# Patient Record
Sex: MALE | Race: WHITE | NOT HISPANIC OR LATINO | Employment: FULL TIME | URBAN - METROPOLITAN AREA
[De-identification: names, ages, dates, MRNs, and addresses within clinical notes are randomized per-mention and may not be internally consistent; named-entity substitution may affect disease eponyms.]

---

## 2017-04-03 ENCOUNTER — HOSPITAL ENCOUNTER (EMERGENCY)
Facility: HOSPITAL | Age: 37
Discharge: HOME | End: 2017-04-04
Attending: EMERGENCY MEDICINE | Admitting: EMERGENCY MEDICINE

## 2017-04-03 DIAGNOSIS — T15.91XA FOREIGN BODY OF RIGHT EYE, INITIAL ENCOUNTER: Primary | ICD-10-CM

## 2017-04-03 PROCEDURE — 99283 EMERGENCY DEPT VISIT LOW MDM: CPT

## 2017-04-04 VITALS
OXYGEN SATURATION: 97 % | WEIGHT: 178 LBS | HEART RATE: 79 BPM | SYSTOLIC BLOOD PRESSURE: 142 MMHG | HEIGHT: 73 IN | BODY MASS INDEX: 23.59 KG/M2 | RESPIRATION RATE: 18 BRPM | TEMPERATURE: 98.3 F | DIASTOLIC BLOOD PRESSURE: 92 MMHG

## 2017-04-04 PROCEDURE — 99282 EMERGENCY DEPT VISIT SF MDM: CPT | Performed by: EMERGENCY MEDICINE

## 2017-04-04 RX ORDER — PURIFIED WATER 986 MG/ML
SOLUTION OPHTHALMIC ONCE AS NEEDED
Status: COMPLETED | OUTPATIENT
Start: 2017-04-04 | End: 2017-04-04

## 2017-04-04 RX ORDER — PURIFIED WATER 986 MG/ML
SOLUTION OPHTHALMIC
Status: COMPLETED
Start: 2017-04-04 | End: 2017-04-04

## 2017-04-04 RX ORDER — BUPRENORPHINE HYDROCHLORIDE AND NALOXONE HYDROCHLORIDE DIHYDRATE 8; 2 MG/1; MG/1
1 TABLET SUBLINGUAL EVERY MORNING
COMMUNITY

## 2017-04-04 RX ORDER — TRAMADOL HYDROCHLORIDE 50 MG/1
50 TABLET ORAL EVERY 6 HOURS PRN
Qty: 20 TABLET | Refills: 0 | Status: SHIPPED | OUTPATIENT
Start: 2017-04-04 | End: 2019-07-30

## 2017-04-04 RX ORDER — PROPARACAINE HYDROCHLORIDE 5 MG/ML
2 SOLUTION/ DROPS OPHTHALMIC ONCE
Status: COMPLETED | OUTPATIENT
Start: 2017-04-04 | End: 2017-04-04

## 2017-04-04 RX ORDER — CIPROFLOXACIN HYDROCHLORIDE 3.5 MG/ML
1 SOLUTION/ DROPS TOPICAL
Qty: 10 ML | Refills: 0 | Status: SHIPPED | OUTPATIENT
Start: 2017-04-04 | End: 2019-07-30

## 2017-04-04 RX ADMIN — PURIFIED WATER: 986 SOLUTION OPHTHALMIC at 00:27

## 2017-04-04 RX ADMIN — FLUORESCEIN SODIUM 1 STRIP: 1 STRIP OPHTHALMIC at 00:30

## 2017-04-04 RX ADMIN — PROPARACAINE HYDROCHLORIDE 2 DROP: 5 SOLUTION/ DROPS OPHTHALMIC at 00:29

## 2017-04-04 NOTE — ED PROVIDER NOTES
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  There is some clear drainage coming from the eye.    Narrative: This 36-year-old white male felt like he got something in his eye while driving and around dark tonight.  Complains of some pain in the right upper and right lower lid.  He has had some clear drainage from the eye.  His vision is unchanged.    PCP:  No Known Provider      Review of Systems   Eyes: Positive for pain, discharge (clear) and redness (mild). Negative for photophobia and visual disturbance.        Medication List      ASK your doctor about these medications          buprenorphine-naloxone 8-2 MG per SL tablet   Commonly known as:  SUBOXONE           Past Medical History:   Diagnosis Date   • Narcotic addiction        No Known Allergies    Past Surgical History:   Procedure Laterality Date   • TONSILLECTOMY         History reviewed. No pertinent family history.    Social History     Social History   • Marital status: Single     Spouse name: N/A   • Number of children: N/A   • Years of education: N/A     Social History Main Topics   • Smoking status: Current Every Day Smoker     Packs/day: 1.00     Types: Cigarettes   • Smokeless tobacco: None   • Alcohol use 0.6 oz/week     1 Cans of beer per week   • Drug use: No   • Sexual activity: Defer     Other Topics Concern   • None     Social History Narrative   • None           Objective   Physical Exam   Constitutional: He appears well-developed and well-nourished. No distress.   ED Triage Vitals:  Temp: 98.3 °F (36.8 °C) (04/04/17 0000)  Heart Rate: 79 (04/04/17 0000)  Resp: 18 (04/04/17 0000)  BP: 142/92  (04/04/17 0000)  SpO2: 97 % (04/04/17 0000)  Temp src: Oral (04/04/17 0000)  Heart Rate Source: Monitor (04/04/17 0000)  Patient Position: Lying (04/04/17 0000)  BP Location: Right arm (04/04/17 0000)  FiO2 (%): n/a    The patient's vitals were reviewed by me.  Unless otherwise noted they are within normal limits.     Eyes: EOM are normal. Pupils are equal, round, and reactive to light. Right eye exhibits discharge (clear). Right eye exhibits no chemosis, no exudate and no hordeolum. Foreign body present in the right eye. Right conjunctiva is injected. Right conjunctiva has no hemorrhage. No scleral icterus.   Fundoscopic exam:       The right eye shows no AV nicking, no exudate, no hemorrhage and no papilledema.   Proparacaine DROPS WERE APPLIED TO THE RIGHT EYE.    The visual acuity was 20/40 in the right eye and 20/40 in the left eye    The fluoroscein stain was negative.    The right eye was irrigated.    The surface the lids were swept with a moistened Q-tip and a form bodies removed from the right upper eyelid.   Nursing note and vitals reviewed.      Procedures         ED Course  ED Course      1:06 AM, 04/04/17:  The patient's diagnosis of a foreign body in the right eye was discussed with him.  The patient will be given a prescription for Ciloxan drops and pain medication.  Patient should follow-up with Dr. Panchal in the office tomorrow.  He has been instructed return to emergency department has increasing pain, change in vision, worsen anyway at all.  All of the patient's questions were answered, he will be discharged in good condition.            MDM  No orders to display     Labs Reviewed - No data to display  No results found.    Final diagnoses:   None         ED Medications:  Medications   fluorescein ophthalmic strip 1 strip (not administered)   proparacaine (ALCAINE) 0.5 % ophthalmic solution 2 drop (not administered)   balanced salts (EYE STREAM) ophthalmic solution  - ADS Override Pull (not  administered)       New Medications:     Medication List      ASK your doctor about these medications          buprenorphine-naloxone 8-2 MG per SL tablet   Commonly known as:  SUBOXONE           Stopped Medications:     Medication List      ASK your doctor about these medications          buprenorphine-naloxone 8-2 MG per SL tablet   Commonly known as:  SUBOXONE             Final diagnoses:   Foreign body of right eye, initial encounter            Yair Fuentes MD  04/04/17 0110

## 2017-04-04 NOTE — DISCHARGE INSTRUCTIONS
Follow up with Dr. Panchal in the morning.  Return if there is increasing pain, change in vision, worse in  anyway at all.

## 2019-04-09 ENCOUNTER — TRANSCRIBE ORDERS (OUTPATIENT)
Dept: ADMINISTRATIVE | Facility: HOSPITAL | Age: 39
End: 2019-04-09

## 2019-04-09 DIAGNOSIS — B18.2 CHRONIC HEPATITIS C WITHOUT HEPATIC COMA (HCC): Primary | ICD-10-CM

## 2020-06-09 ENCOUNTER — HOSPITAL ENCOUNTER (EMERGENCY)
Facility: HOSPITAL | Age: 40
Discharge: HOME | End: 2020-06-09
Attending: EMERGENCY MEDICINE | Admitting: EMERGENCY MEDICINE

## 2020-06-09 VITALS
SYSTOLIC BLOOD PRESSURE: 125 MMHG | HEIGHT: 74 IN | RESPIRATION RATE: 16 BRPM | OXYGEN SATURATION: 99 % | DIASTOLIC BLOOD PRESSURE: 88 MMHG | WEIGHT: 175 LBS | HEART RATE: 82 BPM | TEMPERATURE: 97.7 F | BODY MASS INDEX: 22.46 KG/M2

## 2020-06-09 DIAGNOSIS — S05.01XA ABRASION OF RIGHT CORNEA, INITIAL ENCOUNTER: Primary | ICD-10-CM

## 2020-06-09 PROCEDURE — 99284 EMERGENCY DEPT VISIT MOD MDM: CPT | Performed by: EMERGENCY MEDICINE

## 2020-06-09 PROCEDURE — 99283 EMERGENCY DEPT VISIT LOW MDM: CPT

## 2020-06-09 RX ORDER — CYCLOPENTOLATE HYDROCHLORIDE 10 MG/ML
2 SOLUTION/ DROPS OPHTHALMIC ONCE
Status: COMPLETED | OUTPATIENT
Start: 2020-06-09 | End: 2020-06-09

## 2020-06-09 RX ORDER — DICLOFENAC SODIUM 1 MG/ML
2 SOLUTION/ DROPS OPHTHALMIC ONCE
Status: COMPLETED | OUTPATIENT
Start: 2020-06-09 | End: 2020-06-09

## 2020-06-09 RX ORDER — DICLOFENAC SODIUM 1 MG/ML
1 SOLUTION/ DROPS OPHTHALMIC
Qty: 5 ML | Refills: 0 | Status: SHIPPED | OUTPATIENT
Start: 2020-06-09 | End: 2020-06-12

## 2020-06-09 RX ORDER — PROPARACAINE HYDROCHLORIDE 5 MG/ML
SOLUTION/ DROPS OPHTHALMIC
Status: COMPLETED
Start: 2020-06-09 | End: 2020-06-09

## 2020-06-09 RX ORDER — POLYMYXIN B SULFATE AND TRIMETHOPRIM 1; 10000 MG/ML; [USP'U]/ML
1 SOLUTION OPHTHALMIC EVERY 6 HOURS
Qty: 10 ML | Refills: 0 | Status: SHIPPED | OUTPATIENT
Start: 2020-06-09 | End: 2020-06-12

## 2020-06-09 RX ADMIN — PROPARACAINE HYDROCHLORIDE: 5 SOLUTION/ DROPS OPHTHALMIC at 02:16

## 2020-06-09 RX ADMIN — CYCLOPENTOLATE HYDROCHLORIDE 2 DROP: 10 SOLUTION/ DROPS OPHTHALMIC at 02:13

## 2020-06-09 RX ADMIN — DICLOFENAC SODIUM 2 DROP: 1 SOLUTION OPHTHALMIC at 02:13

## 2020-06-09 RX ADMIN — FLUORESCEIN SODIUM: 1 STRIP OPHTHALMIC at 02:16

## 2020-06-09 NOTE — ED PROVIDER NOTES
"Subjective     History provided by:  Patient    History of Present Illness    · Chief complaint: Eye pain    · Location: Right eye    · Quality/Severity: Right eye painful with pain of the right upper eyelid and \"a hair under my eye lid\".    · Timing/Onset: The patient has solved as could from a saw into his right eye at 7 PM yesterday.  He developed pain in the eye at 2 AM this morning.    · Modifying Factors: Light exacerbates the pain.  The patient states that he is put Q-tips in his eye and flushed his eye trying to get a hair that he saw under his upper lid out.    · Associated symptoms: Vision is slightly blurry in the right eye and photophobia.    · Narrative: The patient is a 39-year-old white male who was cutting wood with a saw without safety glasses when sawdust was kicked into his right eye at 7 PM last night.  He states he flushed it out in the felt fine until he was awoken at 2 AM by eye pain.  He states the light exacerbates the pain.  He states when he looked to his eyelid he thought he saw a hair.  He states he lifted the eyelid multiple times and tried using a Q-tip to remove the eyelid.  He also flushed his eye.    Review of Systems   Constitutional: Negative for activity change, appetite change, chills, diaphoresis, fatigue and fever.   HENT: Negative for congestion, dental problem, ear pain, hearing loss, mouth sores, postnasal drip, rhinorrhea, sinus pressure, sore throat and voice change.    Eyes: Positive for photophobia, pain, redness and visual disturbance. Negative for discharge.   Respiratory: Negative for cough, chest tightness, shortness of breath, wheezing and stridor.    Cardiovascular: Negative for chest pain, palpitations and leg swelling.   Gastrointestinal: Negative for abdominal pain, diarrhea, nausea and vomiting.   Genitourinary: Negative for difficulty urinating, dysuria, flank pain, frequency, hematuria and urgency.   Musculoskeletal: Negative for arthralgias, back pain, gait " "problem, joint swelling, myalgias, neck pain and neck stiffness.   Skin: Negative for color change and rash.   Neurological: Negative for dizziness, tremors, seizures, syncope, facial asymmetry, speech difficulty, weakness, light-headedness, numbness and headaches.   Hematological: Negative for adenopathy.   Psychiatric/Behavioral: Positive for sleep disturbance. Negative for confusion and decreased concentration. The patient is not nervous/anxious.      Past Medical History:   Diagnosis Date   • Narcotic addiction (CMS/HCC)      /88   Pulse 82   Temp 97.7 °F (36.5 °C) (Oral)   Resp 16   Ht 188 cm (74\")   Wt 79.4 kg (175 lb)   SpO2 99%   BMI 22.47 kg/m²     Past Medical History:   Diagnosis Date   • Narcotic addiction (CMS/HCC)        No Known Allergies    Past Surgical History:   Procedure Laterality Date   • TONSILLECTOMY         Family History   Problem Relation Age of Onset   • No Known Problems Mother        Social History     Socioeconomic History   • Marital status: Single     Spouse name: Not on file   • Number of children: Not on file   • Years of education: Not on file   • Highest education level: Not on file   Tobacco Use   • Smoking status: Current Every Day Smoker     Packs/day: 1.00     Types: Cigarettes   Substance and Sexual Activity   • Alcohol use: No     Alcohol/week: 1.0 standard drinks     Types: 1 Cans of beer per week     Frequency: Never   • Drug use: No   • Sexual activity: Defer           Objective   Physical Exam   Constitutional: He is oriented to person, place, and time. He appears well-developed and well-nourished. He appears distressed ( In discomfort due to eye pain).   Review the patient's vital signs: He is afebrile and all his vital signs are within normal limits.   HENT:   Head: Normocephalic and atraumatic.   Nose: Nose normal.   Eyes: EOM are normal. Right eye exhibits no discharge. Left eye exhibits no discharge. No scleral icterus.   The patient has mild edema of " the right upper eyelid.  There is mild injection of the conjunctiva.  After anesthetizing the eye with proparacaine, the lids were everted completely and no foreign body was found.  The eye was examined with fluorescein showing very small corneal abrasions on the lower half of the cornea.  The right eye was then examined with a slit lamp again showing corneal abrasion seen in the lower half of the cornea there is no foreign bodies in the cornea.  The anterior chamber was clear.  Visual acuity without correction was 20/70 in the right eye and 20/40 in the left eye.   Neck: Normal range of motion. Neck supple.   Lymphadenopathy:     He has no cervical adenopathy.   Neurological: He is alert and oriented to person, place, and time. No cranial nerve deficit or sensory deficit. He exhibits normal muscle tone.   Skin: Skin is warm and dry. Capillary refill takes less than 2 seconds. No rash noted. He is not diaphoretic. No erythema. No pallor.   Psychiatric: Judgment and thought content normal.   Affect consistent with being in pain.   Nursing note and vitals reviewed.      Procedures           ED Course  ED Course as of Jun 09 0400   Tue Jun 09, 2020   0356 The patient received relief from the pain with anesthetization with proparacaine.  Exam revealed corneal abrasions in the anterior aspect of the cornea.  He was admitted ministered cyclo-gel and Voltaren ophthalmologic drops.    [TP]   0358 He was discharged with prescriptions for Voltaren ophthalmologic drops and Polytrim ophthalmologic drops.  He is to make an appointment to follow-up with Dr. Rizo, ophthalmology, if not completely better in 3 days.    [TP]      ED Course User Index  [TP] Brian Oliver MD                                           MDM  Number of Diagnoses or Management Options  Abrasion of right cornea, initial encounter: new and does not require workup  Risk of Complications, Morbidity, and/or Mortality  Presenting problems: moderate  Diagnostic  procedures: moderate  Management options: moderate  General comments: My differential diagnoses includes but is not limited to acute eye pain, chemical conjunctivitis, allergic conjunctivitis, infectious conjunctivitis, gonococcal conjunctivitis, corneal abrasion, corneal ulcer, injury to cornea from contact lens, corneal foreign body.  Corneal alkali burn, corneal acid burn, decreased vision, acute glaucoma, herpes keratitis, hyphema, acute iritis, orbital wall fracture, penetrating eye injury, retinal detachment, temporal arteritis, UV keratitis.    Patient Progress  Patient progress: stable      Final diagnoses:   Abrasion of right cornea, initial encounter           Labs Reviewed - No data to display  No orders to display          Medication List      New Prescriptions    diclofenac 0.1 % ophthalmic solution  Commonly known as:  VOLTAREN  Administer 1 drop to the right eye Every 3 (Three) Hours As Needed (eye   pain) for up to 3 days.     trimethoprim-polymyxin b 80622-8.1 UNIT/ML-% ophthalmic solution  Commonly known as:  Polytrim  Administer 1 drop to the right eye Every 6 (Six) Hours for 3 days.               Brian Oliver MD  06/09/20 0402